# Patient Record
Sex: FEMALE | Race: WHITE | Employment: FULL TIME | URBAN - METROPOLITAN AREA
[De-identification: names, ages, dates, MRNs, and addresses within clinical notes are randomized per-mention and may not be internally consistent; named-entity substitution may affect disease eponyms.]

---

## 2017-02-02 DIAGNOSIS — F40.01 FEAR OF TRAVEL WITH PANIC ATTACKS: Primary | ICD-10-CM

## 2017-02-02 RX ORDER — LORAZEPAM 0.5 MG/1
0.25-0.5 TABLET ORAL EVERY 8 HOURS PRN
Qty: 20 TABLET | Refills: 1 | OUTPATIENT
Start: 2017-02-02

## 2017-02-02 NOTE — TELEPHONE ENCOUNTER
Patient called to request the LORazepam (ATIVAN) 0.5 MG tablet  Patient going to [place] and has to take off 5 times  Wants to know if the qty can be increased due to the many times she will be taking off  Pharmacy pended  Please call patient when this has been sent to the pharmacy [phone]     LORazepam (ATIVAN) 0.5 MG tablet      Last Written Prescription Date:  4/7/16  Last Fill Quantity: 5,   # refills: 1  Last Office Visit with FMONEL, CLEMENT or [facility] prescribing provider: 6/27/16  Future Office visit:           Chelsea ARRIAZA

## 2017-02-02 NOTE — TELEPHONE ENCOUNTER
RX [place] ([place]) reviewed:  reviewed- no concerns    [place]:  [email or web address]    Tosha Blount RN  [facility]  [phone]

## 2017-02-06 RX ORDER — LORAZEPAM 0.5 MG/1
0.25-0.5 TABLET ORAL EVERY 8 HOURS PRN
Qty: 14 TABLET | Refills: 0 | Status: SHIPPED | OUTPATIENT
Start: 2017-02-06 | End: 2019-03-15

## 2017-02-07 NOTE — TELEPHONE ENCOUNTER
Script taken to the clinic pharmacy  Mychart message sent to patient re: appointment   RN please update phq-9 & SHAHEEN with patient  Chelsea ARRIAZA

## 2017-02-07 NOTE — TELEPHONE ENCOUNTER
Ok for refill. Script printed. Ok to give to patient.   Should return for yearly check in April, sooner if problem   Please have her update phq-9 and angel in the meantime

## 2017-03-01 ENCOUNTER — TRANSFERRED RECORDS (OUTPATIENT)
Dept: HEALTH INFORMATION MANAGEMENT | Facility: CLINIC | Age: 37
End: 2017-03-01

## 2017-03-01 LAB — PAP SMEAR - HIM PATIENT REPORTED: NEGATIVE

## 2017-04-19 ENCOUNTER — OFFICE VISIT (OUTPATIENT)
Dept: FAMILY MEDICINE | Facility: CLINIC | Age: 37
End: 2017-04-19
Payer: COMMERCIAL

## 2017-04-19 DIAGNOSIS — Z86.018 HISTORY OF DYSPLASTIC NEVUS: Primary | ICD-10-CM

## 2017-04-19 DIAGNOSIS — D22.5 MELANOCYTIC NEVI OF TRUNK: ICD-10-CM

## 2017-04-19 PROCEDURE — 99213 OFFICE O/P EST LOW 20 MIN: CPT | Performed by: FAMILY MEDICINE

## 2017-04-19 NOTE — MR AVS SNAPSHOT
"              After Visit Summary   4/19/2017    Princess Fleming    MRN: [id number]           Patient Information     Date Of Birth          1980        Visit Information        Provider Department      4/19/2017 11:40 AM Mariah Cartwright MD [facility] - Primary Care Skin        Today's Diagnoses     History of dysplastic nevus    -  1    Melanocytic nevi of trunk          Care Instructions    FUTURE APPOINTMENTS  Follow up every 1 year(s) for an annual full-body skin cancer screening in summer 2016.    TIPS FOR AVOIDING SKIN CANCER AND PREMATURE SKIN AGING  DOs    Wear a wide-brimmed hat and sunglasses.     Wear sun-protective clothing.    [employer] and other [employer] make sun protective clothing that is stylish, comfortable and cool.    [employer] and other companies make UV arm sleeves suitable for golfing, gardening and other activities.      Wear sunscreen on your face every day, even in the winter. (UVA \"aging rays\" penetrates window glass and is just as strong in the winter as in the summer) Sunscreen with SPF > 30 is recommended.    Wear sunscreen on your body and re-apply every 2 hours when exposed to sun. Sunscreen with SPF > 50 is recommended.    You should use about 3 tablespoons of sunscreen to protect your whole body. Thus a typical eight ounce bottle of sunscreen should last 4 applications. Remember, that the SPF rating is compromised if you don t apply enough. Most people only apply 1/2 - 1/3 of the amount they need. Also don t forget areas such as your ears, feet, upper back and harder to reach places. Keep in mind that these amounts should be increased for larger body sizes.    Note that spray sunscreens are only for touch-up application, not as a base layer. Also, use spray sunscreens with caution around small children due to inhalation risk.    Product Recommendations:    Look for broad spectrum sunscreen (blocks both UVA and UVB).    Look for titanium " "dioxide and/or zinc oxide in the active ingredients, which are physical blockers as opposed to chemical blockers. Chemical-free sunscreens should not irritate the skin.    Good examples include: Blue Lizard, EltaMD, Vanicream, Solbar, CeraVe.     For sensitive skin, consider : SkinMedica Essential Defense Mineral Shield Broad Spectrum SPF 35      Avoid combination products that include both sunscreen and insect repellant, as sunscreen should be applied every 2 hours, but insect repellant should not be applied as frequently.    Avoid products that include oxybenzone or retinyl palmitate.    For more information:  http://www.skincancer.org/prevention/sun-protection/sunscreen/sunscreens-safe-and-effective    DON'Ts    All tanning damages the skin. Aim for ivory skin year round and you will have less trouble with your skin in years to come. There is no merit in getting \"a base tan\" before a warm weather vacation, as any tanning indicates your body's response to sun damage.    Never use tanning beds. Tanning beds are associated with much higher risks of skin cancer.    Avoid mid-day sunshine (10 AM to 3 PM), if possible.    Stop smoking. Smokers have higher rates of skin cancer and also have premature skin wrinkling.        Follow-ups after your visit        Who to contact     If you have questions or need follow up information about today's clinic visit or your schedule please contact [facility] - PRIMARY CARE SKIN directly at [phone].  Normal or non-critical lab and imaging results will be communicated to you by MyChart, letter or phone within 4 business days after the clinic has received the results. If you do not hear from us within 7 days, please contact the clinic through [employer] or phone. If you have a critical or abnormal lab result, we will notify you by phone as soon as possible.  Submit refill requests through [employer] or call your pharmacy and they will forward the refill request to us. Please allow " 3 business days for your refill to be completed.          Additional Information About Your Visit        MyChart Information     [employer] gives you secure access to your electronic health record. If you see a primary care provider, you can also send messages to your care team and make appointments. If you have questions, please call your primary care clinic.  If you do not have a primary care provider, please call [phone] and they will assist you.        Care EveryWhere ID     This is your Care EveryWhere ID. This could be used by other organizations to access your [facility] medical records  [id number]         Blood Pressure from Last 3 Encounters:   04/07/16 114/68   02/28/14 119/84   09/23/13 110/70    Weight from Last 3 Encounters:   04/07/16 144 lb (65.3 kg)   02/28/14 142 lb (64.4 kg)   09/23/13 152 lb (68.9 kg)              Today, you had the following     No orders found for display       Primary Care Provider Office Phone # Fax #    Mag Maher [phone]       [place] [address] [place] [address] [address]  [place] [address]        Thank you!     Thank you for choosing [facility] - PRIMARY CARE SKIN  for your care. Our goal is always to provide you with excellent care. Hearing back from our patients is one way we can continue to improve our services. Please take a few minutes to complete the written survey that you may receive in the mail after your visit with us. Thank you!             Your Updated Medication List - Protect others around you: Learn how to safely use, store and throw away your medicines at www.disposemymeds.org.          This list is accurate as of: 4/19/17 11:54 AM.  Always use your most recent med list.                   Brand Name Dispense Instructions for use    LORazepam 0.5 MG tablet    ATIVAN    14 tablet    Take 0.5-1 tablets (0.25-0.5 mg) by mouth every 8 hours as needed for anxiety Take 30 minutes prior to departure.  Do not operate a vehicle  after taking this medication

## 2017-04-19 NOTE — PROGRESS NOTES
[facility] - PRIMARY CARE SKIN    CC : Lesion(s)  SUBJECTIVE:                                                    Princess Fleming is a 37 year old female who presents to clinic today because of a changing mole.    Bothersome lesions noticed by the patient or other skin concerns :  Issue One : Mole on the chest has been changing in shape.  Onset : mole has been present for years.  Enlarging : NO.  Bleeding : NO  Itchy or irritating : NO.  Pain or tenderness : NO.  Changing color : NO.    Personal history of skin cancer : NO - history of one excised dysplastic nevus.  Family history of skin cancer : unknown due to adoption.    Sun Exposure History  Previous history of significant sun exposure:  Blistering sunburns : YES - once  Tanning beds : NO.  Sunscreen Use : YES, frequency : daily on face, SPF : 30-50.  Sun-protective clothing use : No  Wide-brimmed hats : Yes  Sunglasses : Yes  Seeks shade : Yes    Occupation : technology sales (indoor).    Refer to electronic medical record (EMR) for past medical history and medications.    INTEGUMENTARY/SKIN: POSITIVE for mole changes  ROS : 14 point review of systems was negative except the symptoms listed above in the HPI.    This document serves as a record of the services and decisions personally performed and made by Adri Cartwright MD. It was created on her behalf by Genaro Linares, a trained medical scribe.  The creation of this document is based on the scribe's personal observations and the provider's statements to the medical scribe.  Genaro Linares, April 19, 2017 11:48 AM      OBJECTIVE:                                                    GENERAL: healthy, alert and no distress  SKIN: [place] Skin Type - I.  Trunk were examined. The dermatoscope was used to help evaluate pigmented lesions.  Skin Pertinent Findings:  Right lateral chest wall : 3 mm in size raised brown lesion with darker pigmentation. Dermoscopy - no suspicious characteristics.     Diagnostic Test  "Results:  none           ASSESSMENT:                                                      Encounter Diagnoses   Name Primary?     History of dysplastic nevus Yes     Melanocytic nevi of trunk          PLAN:                                                    Patient Instructions   FUTURE APPOINTMENTS  Follow up every 1 year(s) for an annual full-body skin cancer screening in summer 2016.    TIPS FOR AVOIDING SKIN CANCER AND PREMATURE SKIN AGING  DOs    Wear a wide-brimmed hat and sunglasses.     Wear sun-protective clothing.    [employer] and other i'mma make sun protective clothing that is stylish, comfortable and cool.    [employer] and other i'mma make UV arm sleeves suitable for golfing, gardening and other activities.      Wear sunscreen on your face every day, even in the winter. (UVA \"aging rays\" penetrates window glass and is just as strong in the winter as in the summer) Sunscreen with SPF > 30 is recommended.    Wear sunscreen on your body and re-apply every 2 hours when exposed to sun. Sunscreen with SPF > 50 is recommended.    You should use about 3 tablespoons of sunscreen to protect your whole body. Thus a typical eight ounce bottle of sunscreen should last 4 applications. Remember, that the SPF rating is compromised if you don t apply enough. Most people only apply 1/2 - 1/3 of the amount they need. Also don t forget areas such as your ears, feet, upper back and harder to reach places. Keep in mind that these amounts should be increased for larger body sizes.    Note that spray sunscreens are only for touch-up application, not as a base layer. Also, use spray sunscreens with caution around small children due to inhalation risk.    Product Recommendations:    Look for broad spectrum sunscreen (blocks both UVA and UVB).    Look for titanium dioxide and/or zinc oxide in the active ingredients, which are physical blockers as opposed to chemical blockers. Chemical-free sunscreens should " "not irritate the skin.    Good examples include: Blue Lizard, EltaMD, Vanicream, Solbar, CeraVe.     For sensitive skin, consider : SkinMedica Essential Defense Mineral Shield Broad Spectrum SPF 35      Avoid combination products that include both sunscreen and insect repellant, as sunscreen should be applied every 2 hours, but insect repellant should not be applied as frequently.    Avoid products that include oxybenzone or retinyl palmitate.    For more information:  http://www.skincancer.org/prevention/sun-protection/sunscreen/sunscreens-safe-and-effective    DON'Ts    All tanning damages the skin. Aim for ivory skin year round and you will have less trouble with your skin in years to come. There is no merit in getting \"a base tan\" before a warm weather vacation, as any tanning indicates your body's response to sun damage.    Never use tanning beds. Tanning beds are associated with much higher risks of skin cancer.    Avoid mid-day sunshine (10 AM to 3 PM), if possible.    Stop smoking. Smokers have higher rates of skin cancer and also have premature skin wrinkling.        PROCEDURES:                                                    None.    TT : 20 minutes.  CT : 15 minutes.      The information in this document, created by the medical scribe for me, accurately reflects the services I personally performed and the decisions made by me. I have reviewed and approved this document for accuracy prior to leaving the patient care area.  Adri Cartwright MD April 19, 2017 11:48 AM  [facility] - PRIMARY CARE SKIN  "

## 2017-04-19 NOTE — PATIENT INSTRUCTIONS
"FUTURE APPOINTMENTS  Follow up every 1 year(s) for an annual full-body skin cancer screening in summer 2016.    TIPS FOR AVOIDING SKIN CANCER AND PREMATURE SKIN AGING  DOs    Wear a wide-brimmed hat and sunglasses.     Wear sun-protective clothing.    [employer] and other [employer] make sun protective clothing that is stylish, comfortable and cool.    [employer] and other [employer] make UV arm sleeves suitable for golfing, gardening and other activities.      Wear sunscreen on your face every day, even in the winter. (UVA \"aging rays\" penetrates window glass and is just as strong in the winter as in the summer) Sunscreen with SPF > 30 is recommended.    Wear sunscreen on your body and re-apply every 2 hours when exposed to sun. Sunscreen with SPF > 50 is recommended.    You should use about 3 tablespoons of sunscreen to protect your whole body. Thus a typical eight ounce bottle of sunscreen should last 4 applications. Remember, that the SPF rating is compromised if you don t apply enough. Most people only apply 1/2 - 1/3 of the amount they need. Also don t forget areas such as your ears, feet, upper back and harder to reach places. Keep in mind that these amounts should be increased for larger body sizes.    Note that spray sunscreens are only for touch-up application, not as a base layer. Also, use spray sunscreens with caution around small children due to inhalation risk.    Product Recommendations:    Look for broad spectrum sunscreen (blocks both UVA and UVB).    Look for titanium dioxide and/or zinc oxide in the active ingredients, which are physical blockers as opposed to chemical blockers. Chemical-free sunscreens should not irritate the skin.    Good examples include: Blue Lizard, EltaMD, Vanicream, Solbar, CeraVe.     For sensitive skin, consider : SkinMedica Essential Defense Mineral Shield Broad Spectrum SPF 35      Avoid combination products that include both sunscreen and insect repellant, " "as sunscreen should be applied every 2 hours, but insect repellant should not be applied as frequently.    Avoid products that include oxybenzone or retinyl palmitate.    For more information:  http://www.skincancer.org/prevention/sun-protection/sunscreen/sunscreens-safe-and-effective    DON'Ts    All tanning damages the skin. Aim for ivory skin year round and you will have less trouble with your skin in years to come. There is no merit in getting \"a base tan\" before a warm weather vacation, as any tanning indicates your body's response to sun damage.    Never use tanning beds. Tanning beds are associated with much higher risks of skin cancer.    Avoid mid-day sunshine (10 AM to 3 PM), if possible.    Stop smoking. Smokers have higher rates of skin cancer and also have premature skin wrinkling.  "

## 2017-08-11 DIAGNOSIS — F40.01 FEAR OF TRAVEL WITH PANIC ATTACKS: ICD-10-CM

## 2017-08-11 NOTE — TELEPHONE ENCOUNTER
Patient going out of town next Friday 8/18; just being proactive for the request  Uses the clinic pharmacy    LORazepam (ATIVAN) 0.5 MG tablet      Last Written Prescription Date:  2/6/17  Last Fill Quantity: 14,   # refills: 0  Last Office Visit with [facility], [facility] or [facility] prescribing provider: 4/19/17  Future Office visit:       Routing refill request to provider for review/approval because:  Drug not on the [facility], [facility] or [facility] refill protocol or controlled substance      Chelsea ARRIAZA

## 2017-08-16 RX ORDER — LORAZEPAM 0.5 MG/1
0.25-0.5 TABLET ORAL EVERY 8 HOURS PRN
Qty: 14 TABLET | Refills: 0 | OUTPATIENT
Start: 2017-08-16

## 2018-09-01 ENCOUNTER — HEALTH MAINTENANCE LETTER (OUTPATIENT)
Age: 38
End: 2018-09-01

## 2019-03-15 ENCOUNTER — OFFICE VISIT (OUTPATIENT)
Dept: FAMILY MEDICINE | Facility: CLINIC | Age: 39
End: 2019-03-15
Payer: COMMERCIAL

## 2019-03-15 DIAGNOSIS — Z80.0 FAMILY HISTORY OF COLON CANCER: ICD-10-CM

## 2019-03-15 DIAGNOSIS — Z12.9 ENCOUNTER FOR CANCER SCREENING: Primary | ICD-10-CM

## 2019-03-15 PROCEDURE — 99213 OFFICE O/P EST LOW 20 MIN: CPT | Performed by: INTERNAL MEDICINE

## 2019-03-15 SDOH — HEALTH STABILITY: MENTAL HEALTH: HOW OFTEN DO YOU HAVE A DRINK CONTAINING ALCOHOL?: MONTHLY OR LESS

## 2019-03-15 ASSESSMENT — ANXIETY QUESTIONNAIRES
3. WORRYING TOO MUCH ABOUT DIFFERENT THINGS: SEVERAL DAYS
7. FEELING AFRAID AS IF SOMETHING AWFUL MIGHT HAPPEN: NOT AT ALL
6. BECOMING EASILY ANNOYED OR IRRITABLE: SEVERAL DAYS
5. BEING SO RESTLESS THAT IT IS HARD TO SIT STILL: NOT AT ALL
1. FEELING NERVOUS, ANXIOUS, OR ON EDGE: SEVERAL DAYS
GAD7 TOTAL SCORE: 4
2. NOT BEING ABLE TO STOP OR CONTROL WORRYING: SEVERAL DAYS
IF YOU CHECKED OFF ANY PROBLEMS ON THIS QUESTIONNAIRE, HOW DIFFICULT HAVE THESE PROBLEMS MADE IT FOR YOU TO DO YOUR WORK, TAKE CARE OF THINGS AT HOME, OR GET ALONG WITH OTHER PEOPLE: SOMEWHAT DIFFICULT

## 2019-03-15 ASSESSMENT — PATIENT HEALTH QUESTIONNAIRE - PHQ9
5. POOR APPETITE OR OVEREATING: NOT AT ALL
SUM OF ALL RESPONSES TO PHQ QUESTIONS 1-9: 4

## 2019-03-15 ASSESSMENT — MIFFLIN-ST. JEOR: SCORE: 1359.47

## 2019-03-15 NOTE — Clinical Note
Please abstract the following data from this visit with this patient into the appropriate field in Epic:Pap smear done on this date: 3/2017 (approximately), by this group: DANIELA, results were normal.

## 2019-03-15 NOTE — PROGRESS NOTES
SUBJECTIVE:   Princess Fleming is a 38 year old female who presents to clinic today for the following health issues:    Princess is here with questions about cancer screening.  As she is getting closer to 42, she has been concerned about cancer screening.  She is also seen a lot in the news about younger patients being diagnosed with cancer.  She is adopted so does not know much about her family history.  She does know her maternal grandmother had colon cancer, diagnosed in her mid 60s.  She did an assessment with 23 and me and used this information in a nap and thoughts that she was at risk for colon cancer and heart disease.  Would like to do whatever screening as possible whether or not it is covered by insurance.    She has no current physical symptoms.  She smoked about a pack a day from her early teens to age 32.  She drinks alcohol occasionally.  She has Memo gotten a mammogram with her gynecologist.  She gets regular Pap smears.        Reviewed and updated as needed this visit by clinical staff  Tobacco  Allergies  Meds  Fam Hx  Soc Hx      Reviewed and updated as needed this visit by Provider         ROS:  Const, neuro, cv, pulm, gi, gu reviewed,  otherwise negative unless noted above.       OBJECTIVE:     /60   Pulse 52   Temp 98.7  F (37.1  C) (Tympanic)   Wt 68.9 kg (152 lb)   LMP 02/19/2019 (Exact Date)   BMI 26.09 kg/m    Body mass index is 26.09 kg/m .     GENERAL: healthy, alert and no distress  PSYCH: mentation appears normal, affect normal/bright        ASSESSMENT/PLAN:         1. Encounter for cancer screening  Reviewed that Pap smears are the main recommended screening test for healthy women in their 30s.  She is already getting these regularly with her gynecologist.  She has already done a mammogram as well.  Reviewed recommendations for lung cancer screening, she does not qualify for this.  Also reviewed recommendations for colon cancer screening - one second-degree relative  does not increase her risk to the point that I would recommend she begin screening now.  Also she does not have any symptoms.  She is quite worried about this and wonders if she can meet with a specialist to discuss this further.  Referral ordered for GI.  Continue to stay smoke-free and avoid excess alcohol use.    2. Family history of colon cancer  As above   - GASTROENTEROLOGY ADULT REF CONSULT ONLY    F/U as needed for persistent or worsening symptoms.       Brie Whitmore MD  [facility]

## 2019-03-16 ASSESSMENT — ANXIETY QUESTIONNAIRES: GAD7 TOTAL SCORE: 4

## 2019-06-17 VITALS
TEMPERATURE: 98.7 F | WEIGHT: 152 LBS | HEART RATE: 52 BPM | SYSTOLIC BLOOD PRESSURE: 112 MMHG | HEIGHT: 64 IN | DIASTOLIC BLOOD PRESSURE: 60 MMHG | BODY MASS INDEX: 25.95 KG/M2

## 2020-05-17 ENCOUNTER — TRANSFERRED RECORDS (OUTPATIENT)
Dept: HEALTH INFORMATION MANAGEMENT | Facility: CLINIC | Age: 40
End: 2020-05-17

## 2020-10-13 ENCOUNTER — HOSPITAL ENCOUNTER (OUTPATIENT)
Dept: MAMMOGRAPHY | Facility: CLINIC | Age: 40
End: 2020-10-13
Attending: OBSTETRICS & GYNECOLOGY
Payer: COMMERCIAL

## 2020-10-13 DIAGNOSIS — N63.10 LUMP OF RIGHT BREAST: ICD-10-CM

## 2020-10-13 PROCEDURE — 76642 ULTRASOUND BREAST LIMITED: CPT | Mod: RT

## 2020-10-13 PROCEDURE — 77066 DX MAMMO INCL CAD BI: CPT

## 2021-02-03 ENCOUNTER — APPOINTMENT (OUTPATIENT)
Dept: URBAN - METROPOLITAN AREA CLINIC 256 | Age: 41
Setting detail: DERMATOLOGY
End: 2021-02-03

## 2021-02-05 ENCOUNTER — APPOINTMENT (OUTPATIENT)
Dept: URBAN - METROPOLITAN AREA CLINIC 256 | Age: 41
Setting detail: DERMATOLOGY
End: 2021-02-05

## 2021-03-01 ENCOUNTER — APPOINTMENT (OUTPATIENT)
Dept: URBAN - METROPOLITAN AREA CLINIC 256 | Age: 41
Setting detail: DERMATOLOGY
End: 2021-03-01

## 2021-03-17 ENCOUNTER — APPOINTMENT (OUTPATIENT)
Dept: URBAN - METROPOLITAN AREA CLINIC 256 | Age: 41
Setting detail: DERMATOLOGY
End: 2021-03-17

## 2021-04-08 ENCOUNTER — APPOINTMENT (OUTPATIENT)
Dept: URBAN - METROPOLITAN AREA CLINIC 256 | Age: 41
Setting detail: DERMATOLOGY
End: 2021-04-08

## 2021-04-16 ENCOUNTER — VIRTUAL VISIT (OUTPATIENT)
Dept: FAMILY MEDICINE | Facility: CLINIC | Age: 41
End: 2021-04-16
Payer: COMMERCIAL

## 2021-04-16 DIAGNOSIS — K76.89 HEPATIC CYST: ICD-10-CM

## 2021-04-16 DIAGNOSIS — M89.9 BONE LESION: ICD-10-CM

## 2021-04-16 DIAGNOSIS — R93.89 ABNORMAL CT SCAN: Primary | ICD-10-CM

## 2021-04-16 PROCEDURE — 99214 OFFICE O/P EST MOD 30 MIN: CPT | Mod: TEL | Performed by: INTERNAL MEDICINE

## 2021-04-16 NOTE — PROGRESS NOTES
"Princess is a 41 year old who is being evaluated via a billable telephone visit.      What phone number would you like to be contacted at? [phone]  How would you like to obtain your AVS? [place]    Assessment and Plan  1. Abnormal CT scan  Patient stats that she had a CT Renal 6 months back at \" [address] [place] [address] , [facility] \"  [facility] \" and it needed a surveillance CT at this time as per the report. She will come for lab only visit as didn't have any labs recently as per chart review after 2016. Will await for the CT report for my review before placing the new imaging orders. Pt understood and agreed with the plan.   - CBC with platelets differential; Future  - Comprehensive metabolic panel; Future  - MR Abdomen w Contrast; Future  - MR Pelvis Bone w Contrast; Future    UPDATE - Received CT scan report done in 5/2020 from [facility], as per the report she was positive for Nephrolithiasis which is resolved as per pt symptoms. There was incidental finding of Hepatic cysts and also \" Indeterminate Sclerotic lesion of Rt Iliac bone for which radiology recommends MRI for further interpretation. Will repeat a surveillance imaging at this time and do further recommendations.     2. Hepatic cyst  - MR Abdomen w Contrast; Future    3. Bone lesion  - MR Pelvis Bone w Contrast; Future  - **Basic metabolic panel FUTURE anytime; Future         Patient Instructions     As discussed with you , I shall await for the CT scan report which you have done at \"[address] , [facility] \"  Please make lab only appointment to get this labs done for your abnormal CT results in the past.     ==================================    Patient Education     Computed Tomography (CT)     During the test, relax and remain as still as you can.   Computed tomography (CT) is a test that combines X-rays and computer scans. The result is a detailed picture that can show problems with soft tissues- (such as the " "lining of your sinuses), organs (such as your kidneys or lungs), blood vessels, and bones.   Tell the technologist   Be sure to tell the technologist if you:    Have allergies or kidney problems    Take diabetes medicine    Are pregnant or think you may be    Ate or drank anything before the test  Before your test    Be sure to tell your healthcare provider if you have ever had a reaction to contrast material (\"X-ray dye\"). If you have had a reaction, you may need to take medicine before your scan, so be sure to tell your provider ahead of time.     Be sure to mention the medicines you take. Ask if it's OK to take them before the test.     Follow any directions you re given for not eating or drinking before the procedure. Your provider will give you instructions if required. You may be required to drink contrast by mouth before arriving for the study depending on the type of exam you are having. Your provider or the imaging site will provide this for you.    The length of the procedure may vary, depending on your condition and your provider's practices.    Arrive on time to check in.    When you arrive, you may be asked to change into a hospital gown. Remove all metal near the part of your body that will be scanned, including jewelry, eyeglasses, and dentures. Women may need to remove any bra that has metal underwire.     During your test    You may be given contrast through an IV (intravenous) line or by mouth.    You will lie on a table. The table slides into the CT scanner.    The technologist will ask you to hold your breath for a few seconds during your scan.    After your test    You can go back to your normal diet and activities right away. Any contrast will pass naturally through your body within a day.    Before leaving, you may need to wait briefly while your images are being reviewed. Your healthcare provider will discuss the test results with you during a follow-up appointment or over the phone.    Your " next appointment is:__________________    Román last reviewed this educational content on 2019-2021 [employer]. All rights reserved. This information is not intended as a substitute for professional medical care. Always follow your healthcare professional's instructions.             Return in about 4 weeks (around 2021), or if symptoms worsen or fail to improve, for Preventative Visit.    Leny Mccormack MD  [facility] SONIDO Sánchez is a 41 year old who presents for the following health issues       HPI     Patient would like a new order for imaging: the same imaging to look for kidney stones. Patient did not know the exact imaging.    Patient does not wish to establish care with a provider: holistic medicine outside the medical setting.        Allergies   Allergen Reactions     Sulfa Drugs         Past Medical History:   Diagnosis Date     Opioid dependence (H) 2012       Past Surgical History:   Procedure Laterality Date     NO HISTORY OF SURGERY         Family History   Adopted: Yes   Problem Relation Age of Onset     Family History Negative Mother      Unknown/Adopted Father      Colon Cancer Maternal Grandmother 69       Social History     Tobacco Use     Smoking status: Former Smoker     Packs/day: 1.00     Years: 15.00     Pack years: 15.00     Types: Cigarettes     Quit date: 4/15/2012     Years since quittin.0     Smokeless tobacco: Never Used     Tobacco comment: started at age 14    Substance Use Topics     Alcohol use: Yes     Alcohol/week: 0.0 standard drinks     Frequency: Monthly or less        No current outpatient medications on file.     No current facility-administered medications for this visit.         Review of Systems   Constitutional, HEENT, cardiovascular, pulmonary, GI, , musculoskeletal, neuro, skin, endocrine and psych systems are negative, except as otherwise noted.      Objective            Vitals:  No vitals were obtained today due to virtual visit.    Physical Exam   healthy, alert and no distress  PSYCH: Alert and oriented times 3; coherent speech, normal   rate and volume, able to articulate logical thoughts, able   to abstract reason, no tangential thoughts, no hallucinations   or delusions  Her affect is normal  RESP: No cough, no audible wheezing, able to talk in full sentences  Remainder of exam unable to be completed due to telephone visits    Labs and imaging reviewed and discussed with the patient.      Phone call duration: 13  minutes

## 2021-04-17 ENCOUNTER — TELEPHONE (OUTPATIENT)
Dept: FAMILY MEDICINE | Facility: CLINIC | Age: 41
End: 2021-04-17

## 2021-04-17 NOTE — TELEPHONE ENCOUNTER
"Please get the records of CT scan report which patient have done at \" [address] [place] [address] , [place] [facility] , which was 6 months back as per the patient. If they need medical release, we might need to call the patient and let her know, which I already gave her heads up on her recent telephone visit with me. She also needs to get the lab work done.     Thank you,  Leny Mccormack MD on 4/17/2021 at 1:21 PM  "

## 2021-04-27 DIAGNOSIS — R93.89 ABNORMAL CT SCAN: ICD-10-CM

## 2021-04-27 LAB
ALBUMIN SERPL-MCNC: 4.1 G/DL (ref 3.4–5)
ALP SERPL-CCNC: 68 U/L (ref 40–150)
ALT SERPL W P-5'-P-CCNC: 50 U/L (ref 0–50)
ANION GAP SERPL CALCULATED.3IONS-SCNC: 1 MMOL/L (ref 3–14)
AST SERPL W P-5'-P-CCNC: 24 U/L (ref 0–45)
BASOPHILS # BLD AUTO: 0 10E9/L (ref 0–0.2)
BASOPHILS NFR BLD AUTO: 0.5 %
BILIRUB SERPL-MCNC: 0.3 MG/DL (ref 0.2–1.3)
BUN SERPL-MCNC: 12 MG/DL (ref 7–30)
CALCIUM SERPL-MCNC: 8.9 MG/DL (ref 8.5–10.1)
CHLORIDE SERPL-SCNC: 111 MMOL/L (ref 94–109)
CO2 SERPL-SCNC: 29 MMOL/L (ref 20–32)
CREAT SERPL-MCNC: 0.71 MG/DL (ref 0.52–1.04)
DIFFERENTIAL METHOD BLD: NORMAL
EOSINOPHIL # BLD AUTO: 0.1 10E9/L (ref 0–0.7)
EOSINOPHIL NFR BLD AUTO: 2.9 %
ERYTHROCYTE [DISTWIDTH] IN BLOOD BY AUTOMATED COUNT: 12.3 % (ref 10–15)
GFR SERPL CREATININE-BSD FRML MDRD: >90 ML/MIN/{1.73_M2}
GLUCOSE SERPL-MCNC: 84 MG/DL (ref 70–99)
HCT VFR BLD AUTO: 37.7 % (ref 35–47)
HGB BLD-MCNC: 12.9 G/DL (ref 11.7–15.7)
LYMPHOCYTES # BLD AUTO: 1.3 10E9/L (ref 0.8–5.3)
LYMPHOCYTES NFR BLD AUTO: 30.5 %
MCH RBC QN AUTO: 30 PG (ref 26.5–33)
MCHC RBC AUTO-ENTMCNC: 34.2 G/DL (ref 31.5–36.5)
MCV RBC AUTO: 88 FL (ref 78–100)
MONOCYTES # BLD AUTO: 0.4 10E9/L (ref 0–1.3)
MONOCYTES NFR BLD AUTO: 9.8 %
NEUTROPHILS # BLD AUTO: 2.4 10E9/L (ref 1.6–8.3)
NEUTROPHILS NFR BLD AUTO: 56.3 %
PLATELET # BLD AUTO: 246 10E9/L (ref 150–450)
POTASSIUM SERPL-SCNC: 3.8 MMOL/L (ref 3.4–5.3)
PROT SERPL-MCNC: 7.5 G/DL (ref 6.8–8.8)
RBC # BLD AUTO: 4.3 10E12/L (ref 3.8–5.2)
SODIUM SERPL-SCNC: 141 MMOL/L (ref 133–144)
WBC # BLD AUTO: 4.2 10E9/L (ref 4–11)

## 2021-04-27 PROCEDURE — 85025 COMPLETE CBC W/AUTO DIFF WBC: CPT | Performed by: INTERNAL MEDICINE

## 2021-04-27 PROCEDURE — 80053 COMPREHEN METABOLIC PANEL: CPT | Performed by: INTERNAL MEDICINE

## 2021-04-27 PROCEDURE — 36415 COLL VENOUS BLD VENIPUNCTURE: CPT | Performed by: INTERNAL MEDICINE

## 2021-04-28 ENCOUNTER — TELEPHONE (OUTPATIENT)
Dept: FAMILY MEDICINE | Facility: CLINIC | Age: 41
End: 2021-04-28

## 2021-04-28 NOTE — TELEPHONE ENCOUNTER
Result message given from Dr. Mccormack.    Pt stated that she signed release of information at her last visit on 4-17-21 for CT scan to be sent to [facility].  Routing to  to follow up.      Pt verbalized understanding, all questions answered.     Demi Stephens RN

## 2021-04-28 NOTE — TELEPHONE ENCOUNTER
----- Message from Leny Mccormack MD sent at 4/27/2021  8:18 PM CDT -----  All your labs normal, you may see some highlighted which do not have Clinical significance.    I still await for you to send me the CT scan done in the past at outside facility which you stated in your Telephone visit with me. Please let me know if you have any questions.    Thank you,  Leny Mccormack MD on 4/27/2021 at 8:16 PM

## 2021-05-17 ENCOUNTER — TRANSFERRED RECORDS (OUTPATIENT)
Dept: HEALTH INFORMATION MANAGEMENT | Facility: CLINIC | Age: 41
End: 2021-05-17

## 2021-05-25 ENCOUNTER — HOSPITAL ENCOUNTER (OUTPATIENT)
Dept: MRI IMAGING | Facility: CLINIC | Age: 41
End: 2021-05-25
Attending: INTERNAL MEDICINE
Payer: COMMERCIAL

## 2021-05-25 DIAGNOSIS — R93.89 ABNORMAL CT SCAN: ICD-10-CM

## 2021-05-25 DIAGNOSIS — M89.9 BONE LESION: ICD-10-CM

## 2021-05-25 PROCEDURE — 72195 MRI PELVIS W/O DYE: CPT

## 2021-05-26 ENCOUNTER — RECORDS - HEALTHEAST (OUTPATIENT)
Dept: ADMINISTRATIVE | Facility: CLINIC | Age: 41
End: 2021-05-26

## 2021-05-27 ENCOUNTER — TELEPHONE (OUTPATIENT)
Dept: FAMILY MEDICINE | Facility: CLINIC | Age: 41
End: 2021-05-27

## 2021-05-27 NOTE — TELEPHONE ENCOUNTER
----- Message from Leny Mccormack MD sent at 5/27/2021 12:44 AM CDT -----  Your MRI pelvic bone is benign, no findings of concern.  Leny Mccormack MD on 5/27/2021 at 12:43 AM

## 2021-05-28 ENCOUNTER — APPOINTMENT (OUTPATIENT)
Dept: URBAN - METROPOLITAN AREA CLINIC 256 | Age: 41
Setting detail: DERMATOLOGY
End: 2021-05-28

## 2021-07-29 ENCOUNTER — PATIENT OUTREACH (OUTPATIENT)
Dept: FAMILY MEDICINE | Facility: CLINIC | Age: 41
End: 2021-07-29

## 2021-07-29 NOTE — TELEPHONE ENCOUNTER
Patient Quality Outreach      Summary:    Patient has the following on her problem list/HM: None    Patient is due/failing the following:   Cervical Cancer Screening - PAP Needed and Annual wellness, date due: 03/01/2017    Type of outreach:    Sent [employer] message.    Questions for provider review:    None                                                                                                                                Jordyn Rg on 7/29/2021 at 11:41 AM

## 2021-09-27 ENCOUNTER — APPOINTMENT (OUTPATIENT)
Dept: URBAN - METROPOLITAN AREA CLINIC 256 | Age: 41
Setting detail: DERMATOLOGY
End: 2021-09-27

## 2021-09-29 ENCOUNTER — APPOINTMENT (OUTPATIENT)
Dept: URBAN - METROPOLITAN AREA CLINIC 256 | Age: 41
Setting detail: DERMATOLOGY
End: 2021-09-29

## 2021-10-14 NOTE — TELEPHONE ENCOUNTER
Patient Quality Outreach 2nd Attempt      Summary:    Type of outreach:    Phone, left message for patient/parent to call back.    Next Steps:  Reach out within 90 days via Phone and "[employer]hart.    Max number of attempts reached: Yes. Will try again in 90 days if patient still on fail list.    Questions for provider review:    None                                                                                                                    Jordyn Rg on 10/14/2021 at 8:40 AM

## 2021-11-29 ENCOUNTER — APPOINTMENT (OUTPATIENT)
Dept: URBAN - METROPOLITAN AREA CLINIC 256 | Age: 41
Setting detail: DERMATOLOGY
End: 2021-11-29

## 2021-12-30 ENCOUNTER — APPOINTMENT (OUTPATIENT)
Dept: URBAN - METROPOLITAN AREA CLINIC 256 | Age: 41
Setting detail: DERMATOLOGY
End: 2021-12-30

## 2022-01-07 ENCOUNTER — APPOINTMENT (OUTPATIENT)
Dept: URBAN - METROPOLITAN AREA CLINIC 256 | Age: 42
Setting detail: DERMATOLOGY
End: 2022-01-07

## 2022-01-26 ENCOUNTER — APPOINTMENT (OUTPATIENT)
Dept: URBAN - METROPOLITAN AREA CLINIC 256 | Age: 42
Setting detail: DERMATOLOGY
End: 2022-01-26

## 2022-02-24 ENCOUNTER — APPOINTMENT (OUTPATIENT)
Dept: URBAN - METROPOLITAN AREA CLINIC 256 | Age: 42
Setting detail: DERMATOLOGY
End: 2022-02-24

## 2022-04-06 ENCOUNTER — APPOINTMENT (OUTPATIENT)
Dept: URBAN - METROPOLITAN AREA CLINIC 256 | Age: 42
Setting detail: DERMATOLOGY
End: 2022-04-06

## 2022-05-26 ENCOUNTER — APPOINTMENT (OUTPATIENT)
Dept: URBAN - METROPOLITAN AREA CLINIC 256 | Age: 42
Setting detail: DERMATOLOGY
End: 2022-05-26

## 2022-05-27 ENCOUNTER — APPOINTMENT (OUTPATIENT)
Dept: URBAN - METROPOLITAN AREA CLINIC 256 | Age: 42
Setting detail: DERMATOLOGY
End: 2022-05-27

## 2022-06-22 ENCOUNTER — APPOINTMENT (OUTPATIENT)
Dept: URBAN - METROPOLITAN AREA CLINIC 256 | Age: 42
Setting detail: DERMATOLOGY
End: 2022-06-22

## 2022-09-08 ENCOUNTER — APPOINTMENT (OUTPATIENT)
Dept: URBAN - METROPOLITAN AREA CLINIC 256 | Age: 42
Setting detail: DERMATOLOGY
End: 2022-09-08

## 2022-09-08 DIAGNOSIS — Z41.9 ENCOUNTER FOR PROCEDURE FOR PURPOSES OTHER THAN REMEDYING HEALTH STATE, UNSPECIFIED: ICD-10-CM

## 2022-09-08 PROCEDURE — OTHER PLATELET RICH PLASMA INJECTION: OTHER

## 2022-09-08 NOTE — PROCEDURE: PLATELET RICH PLASMA INJECTION
Site Of Venipuncture?: R median cubital
Location #4: hairline
Venipuncture Paragraph: An alcohol pad was applied to the venipuncture site. Venipuncture was performed using a butterfly needle. Pressure and a bandaid was applied to the site. No complications were noted.
Depth In Mm (Will Not Render If 0): 0
Location #2: cheek
Detail Level: Zone
Post-Care Instructions: After the procedure, take precautions agains sun exposure. Do not apply sunscreen for 12 hours after the procedure. Do not apply make-up for 12 hours after the procedure. Avoid alcohol based toners for 10-14 days. After 2-3 days patients can return to their regular skin regimen.
Number Of Tubes Drawn: 1
External Cooling Fan Speed: 5
Consent: Written consent obtained, risks reviewed including but not limited to pain, scarring, infection and incomplete improvement.  Patient understands the procedure is cosmetic in nature and will require out of pocket payment.
Location #3: [facility]
Amount Of Blood Collected (Optional - Include Units): 20 ml
Which Technique?: Default
Location #1: tear trough
Show Additional Techniques: No
Who Performed The Venipuncture?: Curt Anthony RN
Anesthesia Type: 1% lidocaine with epinephrine

## 2022-09-22 ENCOUNTER — APPOINTMENT (OUTPATIENT)
Dept: URBAN - METROPOLITAN AREA CLINIC 256 | Age: 42
Setting detail: DERMATOLOGY
End: 2022-09-22

## 2023-01-18 ENCOUNTER — APPOINTMENT (OUTPATIENT)
Dept: URBAN - METROPOLITAN AREA CLINIC 281 | Age: 43
Setting detail: DERMATOLOGY
End: 2023-01-18